# Patient Record
(demographics unavailable — no encounter records)

---

## 2024-10-28 NOTE — ASSESSMENT
[FreeTextEntry1] :  plan: - will refrain from cystoscopy for now - will request a new CT to rule out renal sue or other gross findings - CBC and BMP

## 2024-10-28 NOTE — PHYSICAL EXAM
[Abdomen Soft] : soft [de-identified] : suprapubic tenderness, no guarding  [Chaperone Present] : A chaperone was present in the examining room during all aspects of the physical examination [FreeTextEntry2] : Sara CONNELLY [FreeTextEntry3] : abdominal exam performed by Leidy BEARD

## 2024-10-28 NOTE — HISTORY OF PRESENT ILLNESS
[FreeTextEntry1] : 42 yo F for initial consultation due to abdominal pain PMH: lupus, Sjogren, graves disease PSH: C section not a smoker  no previous urologic history  for thew past several months having some discomfort in the lower abdomen saw a gynecologist already  rebrewed recent urine culture - negative reviewed CT 2021: no kidney stones, 2.3 cm adnexal cyst, and uterine fibroids patient is still uncomfortable, had suspected blood in the urine in previous tests has symptoms concerning for possible urinary findings  plan: - will refrain from cystoscopy for now - will request a new CT to rule out renal sue or other gross findings - CBC and BMP

## 2024-10-28 NOTE — PHYSICAL EXAM
[Abdomen Soft] : soft [de-identified] : suprapubic tenderness, no guarding  [Chaperone Present] : A chaperone was present in the examining room during all aspects of the physical examination [FreeTextEntry2] : Sara CONNELLY [FreeTextEntry3] : abdominal exam performed by Leidy BEARD

## 2024-11-11 NOTE — HISTORY OF PRESENT ILLNESS
[Home] : at home, [unfilled] , at the time of the visit. [Medical Office: ([facility])___] : at the medical office located in  [Verbal consent obtained from patient] : the patient, [unfilled] [FreeTextEntry1] : 40 yo F for follow up with abdominal pain see last note for full details  reviewed labs with normal CBC reviewed BMP with CRE 0.7, normal NA and K reviewed new CT: normal kidneys, no stones, only known 1.1 cm ovarian cyst  discussed with patient recommended consulting with GI for possible causes of abdominal discomfort, bt this does not seem to be related to kidney stones patient will update for any worsening of symptoms

## 2024-11-20 NOTE — HISTORY OF PRESENT ILLNESS
[FreeTextEntry1] : 40 yo F for evaluation with recurrent voiding complains and abdominal discomfort  had last discussion on 11/11/2024 labs and CT with no significant findings patient again with worsening now, and new dysuria discussed the possibility for a cystoscopy, but recommended consulting with a specialist before and if needed will be performed with him  plan: - urine culture - urinalysis - abx - pyridium - recommended consulting with Dr. Ruiz

## 2024-11-20 NOTE — ASSESSMENT
[FreeTextEntry1] :  plan: - urine culture - urinalysis - abx - pyridium - recommended consulting with Dr. Ruiz

## 2024-11-26 NOTE — HISTORY OF PRESENT ILLNESS
[FreeTextEntry1] : This 41-year-old female, status post bilateral tubal ligation (BTL) in 2019 and with a history of two vaginal deliveries and one  section, presents with a 6-week history of intermittent and worsening dysuria (painful urination).  The onset location is unclear. She identifies several potential risk factors, including previous childbirth, urinary tract infections, and spicy food intake. The dysuria is described as troubling, annoying, bothersome, and inconvenient, with a severity of 8/10.  She denies a prior hysterectomy.  Key features of her presentation:  * Dysuria (6-week duration, intermittent, worsening, 8/10 severity) * Pain before, during, and after urination * Risk factors: Previous childbirth (2 vaginal, 1 ), UTI, recent procedure, spicy food intake * Associated symptoms: Incomplete bladder emptying, back pain, suprapubic pain * Alleviating factors: Antibiotics, pain medication * No specific aggravating factors identified * Surgical history: BTL in 2019  2024-Interval History: Patient has taken a course of Pyridium and is nearly completed her course of Bactrim.  She also reports that she has started taking Cystex.  She reports that she feels 90% better.  Urine culture returned greater than 100 colonies of Staph saprophyticus.  We discussed having her continue with Cystex and follow-up in 6 weeks to assess symptomatology.  Will also assess whether or not she has had any recurrences.

## 2024-11-26 NOTE — ASSESSMENT
[FreeTextEntry1] : Dysuria   This telephonic visit was provided via audio only technology. The patient, Mona Sainz, was located at home, at the time of the visit.  The provider, Rosendo Corea Jr., MD was located at the medical office located in [place], [place] at the time of the visit. The patient, Mona Sainz and Provider participated in the telephonic visit, which lasted approximately 10 minutes. Verbal consent for telephonic services was given on 11/26/2024 by the patient, Mona Sainz.

## 2024-11-26 NOTE — PLAN
[TextEntry] : Plan: 1.  No further antibiotics required 2.  Pyridium 200 mg p.o. 3 times daily x 3 days as needed 3.  Continue Cystex 4.   office follow-up in 6 weeks for UA if symptoms persist

## 2025-05-13 NOTE — ASSESSMENT
[FreeTextEntry1] : 42 year-old F with PMH of Gilbert syndrome, Raynaud's and Sjogren's being seen for follow-up.   Problem List: 1. Recurrent UTIs 2. Dysuria 3. Suprapubic Discomfort 4.  Microhematuria  Plan: 1.  Renal bladder ultrasound 2.  Office cystoscopy 3.  GI office follow-up after above studies

## 2025-05-13 NOTE — HISTORY OF PRESENT ILLNESS
[FreeTextEntry1] : 42 year-old F with PMH of Gilbert syndrome, Raynaud's and Sjogren's being seen for follow-up.    Problem List: 1. Recurrent UTIs 2. Dysuria 3. Suprapubic Discomfort 4.  Microhematuria   Interval History: (05/13/2025) Patient returns with complaint of continuous suprapubic discomfort.  She reports that the pain is more significant when she is lying down.  She reports that it can be exacerbated by a full bladder but is not typically alleviated after voiding.  She reports that she may have increased pain after voiding at times.  Her most recent urine culture was negative from 4/29/2025.  The urinalysis from today also appears negative.  There were 4 RBCs per high-powered field on previous UA micro from 4/29/2025.

## 2025-05-13 NOTE — PHYSICAL EXAM
[General Appearance - Well Developed] : well developed [Normal Appearance] : normal appearance [General Appearance - In No Acute Distress] : no acute distress [] : no respiratory distress [Skin Color & Pigmentation] : normal skin color and pigmentation [Oriented To Time, Place, And Person] : oriented to person, place, and time [Affect] : the affect was normal

## 2025-05-21 NOTE — ASSESSMENT
[FreeTextEntry1] : plan: - CBC - BMP - urine culture - urinlaysis - abx - CT  - will update result of CT over the phone - seeing Dr. Corea 6/5/2025

## 2025-05-21 NOTE — PHYSICAL EXAM
[General Appearance - Well Developed] : well developed [General Appearance - Well Nourished] : well nourished [Normal Appearance] : normal appearance [Well Groomed] : well groomed [Oriented To Time, Place, And Person] : oriented to person, place, and time [Affect] : the affect was normal [Mood] : the mood was normal [Not Anxious] : not anxious [Chaperoned Physical Exam] : A chaperone was present in the examining room during all aspects of the physical examination. [de-identified] : left side mild CVA tenderness  [FreeTextEntry2] :

## 2025-05-21 NOTE — HISTORY OF PRESENT ILLNESS
[FreeTextEntry1] : 43 yo F with recurrent UTI like symptoms saw me in 11/2024 saw Dr. Corea after planned for cystoscopy soon   here with significant suprapubic discomfort and feeling tired and drained also mentions new onset left side flank pain, that is not her usual lower back pain no fever and no gross hematuria Treated with antibiotics in the past with improvement in some of her symptoms. Reviewed all previous cultures in the [facility] system with the patient, all cultures were negative with the exception of 1 culture suspected for contamination. Patient understands her symptoms very well and is certain there is some improvement after antibiotic treatment with some of the episodes.   reviewed US images: i do not see any hydronephrosis or stones still no report and following up with Dr. corea soon on exam today with mild left CVA tenderness   plan: - CBC - BMP - urine culture - urinlaysis - abx - CT  - will update result of CT over the phone - seeing Dr. Corea 6/5/2025

## 2025-06-04 NOTE — HISTORY OF PRESENT ILLNESS
[FreeTextEntry1] : 42 year-old F with PMH of Gilbert syndrome, Raynaud's and Sjogren's being seen for follow-up.    Problem List: 1. Recurrent UTIs 2. Dysuria 3. Suprapubic Discomfort 4.  Microhematuria  Plan: From 5/13 1. Renal bladder ultrasound 2. Office cystoscopy 3.  office follow-up after above studies.  5/19/2025 Renal/ Bladder US FINDINGS: Right kidney: 11.4 cm. No renal mass, hydronephrosis or calculi. Left kidney: 10.9 cm. No renal mass, hydronephrosis or calculi. Urinary bladder: Within normal limits. Bilateral ureteral jets demonstrated. Prevoid volume of 214 mL. Postvoid volume of 18 mL. IMPRESSION: Normal renal ultrasound.   Interval History:              seen in lobby by provider kalyn; walked out of ED before seeing a nurse; front triage nurse was alerted of departure (Jenny)